# Patient Record
Sex: MALE | Race: BLACK OR AFRICAN AMERICAN | Employment: FULL TIME | URBAN - METROPOLITAN AREA
[De-identification: names, ages, dates, MRNs, and addresses within clinical notes are randomized per-mention and may not be internally consistent; named-entity substitution may affect disease eponyms.]

---

## 2020-08-26 PROBLEM — F17.210 CIGARETTE NICOTINE DEPENDENCE WITHOUT COMPLICATION: Status: ACTIVE | Noted: 2020-08-26

## 2020-08-26 PROBLEM — R19.7 FREQUENT DIARRHEA: Status: ACTIVE | Noted: 2020-08-26

## 2020-08-26 PROBLEM — R10.9 ABDOMINAL CRAMPING: Status: ACTIVE | Noted: 2020-08-26

## 2022-03-18 PROBLEM — R19.7 FREQUENT DIARRHEA: Status: ACTIVE | Noted: 2020-08-26

## 2022-03-18 PROBLEM — F17.210 CIGARETTE NICOTINE DEPENDENCE WITHOUT COMPLICATION: Status: ACTIVE | Noted: 2020-08-26

## 2022-03-18 PROBLEM — F32.2 SEVERE MAJOR DEPRESSION (HCC): Status: ACTIVE | Noted: 2020-08-26

## 2022-03-19 PROBLEM — R10.9 ABDOMINAL CRAMPING: Status: ACTIVE | Noted: 2020-08-26

## 2022-11-27 ENCOUNTER — HOSPITAL ENCOUNTER (EMERGENCY)
Age: 27
Discharge: HOME | End: 2022-11-27
Attending: EMERGENCY MEDICINE
Payer: MEDICAID

## 2022-11-27 VITALS
HEART RATE: 65 BPM | DIASTOLIC BLOOD PRESSURE: 61 MMHG | SYSTOLIC BLOOD PRESSURE: 119 MMHG | BODY MASS INDEX: 19.29 KG/M2 | WEIGHT: 120 LBS | TEMPERATURE: 98.5 F | OXYGEN SATURATION: 98 % | RESPIRATION RATE: 18 BRPM | HEIGHT: 66 IN

## 2022-11-27 DIAGNOSIS — R10.84 ABDOMINAL PAIN, GENERALIZED: Primary | ICD-10-CM

## 2022-11-27 PROCEDURE — 99282 EMERGENCY DEPT VISIT SF MDM: CPT

## 2022-11-27 NOTE — Clinical Note
Mckinley Landisnch was seen and treated in our emergency department on 11/27/2022. Pt was seen today for symptoms that began three days ago.  He is now clear to return to work,    Francisco J Vargas MD

## 2022-11-27 NOTE — ED TRIAGE NOTES
Pt requesting work note d/t abdominal pain; denies currently having symptoms; denies N/V/D; NAD noted

## 2022-11-27 NOTE — ED PROVIDER NOTES
EMERGENCY DEPARTMENT HISTORY AND PHYSICAL EXAM      Date: 11/27/2022  Patient Name: Severo Kotahri    History of Presenting Illness     Chief Complaint   Patient presents with    Letter for School/Work    Abdominal Pain       History Provided By: Patient    HPI: Severo Kothari, 33 y.o. male presents to the ED with CC of having had generalized pain but none at present. He denies having NVD, fever, chills, but had to leave work and needs a note. Patient denies SOB, chest pain, or any neurological symptoms. There are no other complaints, changes, or physical findings at this time. Past History     Past Medical History:  Past Medical History:   Diagnosis Date    Depression     GERD (gastroesophageal reflux disease)        Allergies:  No Known Allergies    Review of Systems   Vital signs and nursing notes reviewed  Review of Systems   Constitutional:  Negative for chills and fever. HENT: Negative. Negative for congestion, rhinorrhea, sneezing and sore throat. Eyes: Negative. Negative for redness and visual disturbance. Respiratory: Negative. Negative for cough, shortness of breath and wheezing. Cardiovascular: Negative. Negative for chest pain and leg swelling. Gastrointestinal:  Positive for abdominal pain. Negative for diarrhea, nausea and vomiting. Genitourinary: Negative. Negative for difficulty urinating and frequency. Musculoskeletal: Negative. Negative for arthralgias, back pain, myalgias and neck stiffness. Skin: Negative. Negative for color change and rash. Neurological: Negative. Hematological:  Negative for adenopathy. Psychiatric/Behavioral: Negative. All other systems reviewed and are negative. Physical Exam   Visit Vitals  /61   Pulse 65   Temp 98.5 °F (36.9 °C)   Resp 18   Ht 5' 6\" (1.676 m)   Wt 54.4 kg (120 lb)   SpO2 98%   BMI 19.37 kg/m²     CONSTITUTIONAL: Alert, in no distress. Appears stated age.   HEAD:  Normocephalic, atraumatic  EYES: EOM intact. No conjunctival injection or scleral icterus  Neck:  Supple. No meningismus  RESP: Normal with no work of breathing, speaking in full sentences. Abd: NON TENDER, NON DISTENDED, NON FOCAL ABD EXAM, NO REBOUND OR GURDING  CV: Well perfused. NEURO: Alert with normal mentation, moving extremities spontaneously  PSYCH: Normal mood, normal affect      Medical Decision Making   DDx:Abd pain that resolved in 12 hours, possible viral syndrome but currently without any complaints with benign physical exam. No work-up needed. ED Course:   Initial assessment performed. The patients presenting problems have been discussed, and they are in agreement with the care plan formulated and outlined with them. I have encouraged them to ask questions as they arise throughout their visit. Critical Care Time: None    Disposition:  DISCHARGE NOTE:  The pt is ready for discharge. The pt's signs, symptoms, diagnosis, and discharge instructions have been discussed and pt has conveyed their understanding. The pt is to follow up as recommended or return to ER should their symptoms worsen. Plan has been discussed and pt is in agreement. PLAN:  1. Current Discharge Medication List        2. Follow-up Information    None         4. Take Tylenol or Ibuprofen as needed  5. Drink plenty of fluids  6. Return to ED if worse especially if pain returns as needed. Diagnosis     Clinical Impression:   1. Abdominal pain, generalized        Please note that this dictation was completed with [employer], the computer voice recognition software. Quite often unanticipated grammatical, syntax, homophones, and other interpretive errors are inadvertently transcribed by the computer software. Please disregards these errors. Please excuse any errors that have escaped final proofreading.

## 2022-11-30 ENCOUNTER — HOSPITAL ENCOUNTER (EMERGENCY)
Age: 27
Discharge: HOME | End: 2022-11-30
Attending: FAMILY MEDICINE
Payer: MEDICAID

## 2022-11-30 VITALS
HEIGHT: 66 IN | WEIGHT: 117 LBS | OXYGEN SATURATION: 100 % | SYSTOLIC BLOOD PRESSURE: 143 MMHG | RESPIRATION RATE: 16 BRPM | BODY MASS INDEX: 18.8 KG/M2 | DIASTOLIC BLOOD PRESSURE: 83 MMHG | HEART RATE: 77 BPM | TEMPERATURE: 98.6 F

## 2022-11-30 DIAGNOSIS — J06.9 VIRAL URI WITH COUGH: Primary | ICD-10-CM

## 2022-11-30 PROCEDURE — 99282 EMERGENCY DEPT VISIT SF MDM: CPT

## 2022-11-30 NOTE — LETTER
[address]  [address] [facility] [address]  [phone]    Work/School Note    Date: 11/30/2022    To Whom It May concern:      Edil Junior was seen and treated today in the emergency room by the following provider(s):  Attending Provider: Reina Simmonds D Bass-Taylor is excused from work/school on 11/28/2022-11/31/2022    He is clear to return to work/school on 12/01/22.         Sincerely,          Callie Maddox RN

## 2022-11-30 NOTE — LETTER
Josy Bailey was seen and treated in our emergency department on 11/30/2022.   Please excuse Noelle Boast from work from 11/28 through 11/30/2022        Julieth Gunn, DO

## 2022-11-30 NOTE — ED PROVIDER NOTES
EMERGENCY DEPARTMENT HISTORY AND PHYSICAL EXAM      Date: 11/30/2022  Patient Name: Bonita Noel    History of Presenting Illness     Chief Complaint   Patient presents with    Letter for School/Work       History Provided By: Patient    HPI: Bonita Noel, 33 y.o. male presents to the ED with CC of letter for work. Patient recently diagnosed with influenza. Now feeling better. No fevers. Patient denies alleviating nor aggravating factors. Patient denies SOB, chest pain, or any neurological symptoms. Patient denies any other symptoms nor concerns at this time  Past History     Past Medical History:  Past Medical History:   Diagnosis Date    Depression     GERD (gastroesophageal reflux disease)        Allergies:  No Known Allergies    Review of Systems   Vital signs and nursing notes reviewed  Review of Systems   Constitutional:  Negative for activity change, appetite change, chills, fatigue and fever. HENT:  Negative for congestion and sore throat. Eyes:  Negative for photophobia and visual disturbance. Respiratory:  Positive for cough. Negative for shortness of breath and wheezing. Cardiovascular:  Negative for chest pain, palpitations and leg swelling. Gastrointestinal:  Negative for abdominal pain, diarrhea, nausea and vomiting. Endocrine: Negative for cold intolerance and heat intolerance. Musculoskeletal:  Negative for gait problem and joint swelling. Skin:  Negative for color change and rash. Neurological:  Negative for dizziness and headaches. Physical Exam   Visit Vitals  BP (!) 143/83   Pulse 77   Temp 98.6 °F (37 °C)   Resp 16   Ht 5' 6\" (1.676 m)   Wt 53.1 kg (117 lb)   SpO2 100%   BMI 18.88 kg/m²     CONSTITUTIONAL: Alert, in no distress. Appears stated age.,  Nontoxic  HEAD:  Normocephalic, atraumatic, uvula midline, no muffled voice, no findings of peritonsillar abscess, tolerating secretions with ease  EYES: EOM intact.   No conjunctival injection or scleral icterus  Neck:  Supple. No meningismus,  RESP: No increased work of breathing, lungs clear to auscultation bilaterally. No wheezes rales nor rhonchi  CV: Heart is regular rate and rhythm, no murmurs, no JVD, capillary refill less than 2 seconds  NEURO: Moves all extremities spontaneously. No motor nor sensory deficits. No focal neurologic deficits. PSYCH: Normal mood, normal affect      ED course/medical decision making       Patient presented to the emergency department with the aforementioned chief complaint. On examination the patient is nontoxic and overall well-appearing. No hypoxia. Well-hydrated on exam.  Lungs are clear to auscultation bilaterally. No increased work of breathing. COVID-19 testing was not conducted. Patient was given quarantine/isolation recommendations and agrees with the plan to be discharged home. They were provided instructions to return to the emergency department with any difficulty breathing, chest pain, altered mentation or any other new or worsening symptoms. Patient was discharged home in stable and ambulatory condition. Critical Care Time: None    Disposition:  DISCHARGE NOTE:  The pt is ready for discharge. The pt's signs, symptoms, diagnosis, and discharge instructions have been discussed and pt has conveyed their understanding. The pt is to follow up as recommended or return to ER should their symptoms worsen. Plan has been discussed and pt is in agreement. PLAN:  1. Current Discharge Medication List        2. Follow-up Information       Follow up With Specialties Details Why Contact Info    Your primary care doctor  Schedule an appointment as soon as possible for a visit in 2 days            3. Take Tylenol or Ibuprofen as needed  4. Drink plenty of fluids  5. Return to ED if worse especially if any shortness of breath, chest pain or altered mentation. Diagnosis     Clinical Impression:   1.  Viral URI with cough            Please note that this dictation was completed with [employer], the computer voice recognition software. Quite often unanticipated grammatical, syntax, homophones, and other interpretive errors are inadvertently transcribed by the computer software. Please disregards these errors. Please excuse any errors that have escaped final proofreading.

## 2024-05-08 ENCOUNTER — HOSPITAL ENCOUNTER (EMERGENCY)
Facility: HOSPITAL | Age: 29
Discharge: HOME | End: 2024-05-08
Attending: EMERGENCY MEDICINE

## 2024-05-08 VITALS
HEART RATE: 95 BPM | SYSTOLIC BLOOD PRESSURE: 139 MMHG | OXYGEN SATURATION: 95 % | DIASTOLIC BLOOD PRESSURE: 87 MMHG | RESPIRATION RATE: 22 BRPM | WEIGHT: 129.2 LBS | BODY MASS INDEX: 19.58 KG/M2 | HEIGHT: 68 IN

## 2024-05-08 DIAGNOSIS — S60.311A ABRASION OF RIGHT THUMB, INITIAL ENCOUNTER: ICD-10-CM

## 2024-05-08 DIAGNOSIS — S70.211A ABRASION OF RIGHT HIP, INITIAL ENCOUNTER: Primary | ICD-10-CM

## 2024-05-08 DIAGNOSIS — F23 ACUTE PSYCHOSIS (HCC): ICD-10-CM

## 2024-05-08 PROCEDURE — 99282 EMERGENCY DEPT VISIT SF MDM: CPT

## 2024-05-08 RX ORDER — LORAZEPAM 2 MG/ML
2 INJECTION INTRAMUSCULAR ONCE
Status: DISCONTINUED | OUTPATIENT
Start: 2024-05-08 | End: 2024-05-08 | Stop reason: HOSPADM

## 2024-05-08 ASSESSMENT — PAIN - FUNCTIONAL ASSESSMENT: PAIN_FUNCTIONAL_ASSESSMENT: NONE - DENIES PAIN

## 2024-05-08 NOTE — DISCHARGE INSTRUCTIONS
[facility]    Accepts Insured Patients Only:  [facility]  [address]       [phone]   Near the corner [facility] [address] [place] [facility].  Accepts most insurance including Medicaid/Medicare.  No psychiatry.  On the [facility] bus line.    [facility]  [address] ([place])     [phone]  [address] [facility] [address]     [phone]  [address] ([place])     [phone]   Accepts most major insurances.  Psychiatry available.  Some DBT groups.    [facility])    [phone]   Mixture of psychologists and psychiatrists.  They do not accept Medicaid or Medicare.    [facility]  [address]       [phone]   Mixture of clinical social workers and psychologists.      Sliding Scale/Financial Aid/Differing Payment Options  [facility]  [address] ([place])      [phone]   Our own Miller Solis and Lin Israel.  Variety of treatment options, including DBT.    [facility]  [facility]       [phone]   Provides a variety of group and individual counseling options.  Insurance, Medicaid, Medicare and sliding scale      Medicaid/Medicare providers in the [address] [place] [address]  [facility]  [address]       [phone]    [facility]         [address]       [phone]    [place] [phone]      [facility]  [facility]     [phone]    [facility]      [phone]    [facility]       [phone]    [facility]      [phone]      Services for patients without Medicaid, Medicare or Insurance  [facility]       [phone]    [facility]--Bharat Vanessa    [phone]

## 2024-05-08 NOTE — ED NOTES
Discharge instructions given to patient by RN. Pt has been given counseling regarding at home treatment plan. Pt verbalizes understanding of need to seek further treatment if symptoms worsen. Pt ambulated off of unit in no signs of distress with [employer].

## 2024-05-08 NOTE — ED PROVIDER NOTES
triamcinolone (KENALOG) 0.1 % ointment Apply topically 2 times daily, Topical, 2 TIMES DAILY Starting Tue 8/31/2021, Historical Med             SCREENINGS               No data recorded         PHYSICAL EXAM      ED Triage Vitals [05/08/24 0013]   Enc Vitals Group      BP (!) 154/102      Pulse 95      Respirations 22      Temp       Temp src       SpO2 95 %      Weight - Scale 58.6 kg (129 lb 3.2 oz)      Height 1.727 m (5' 8\")      Head Circumference       Peak Flow       Pain Score       Pain Loc       Pain Edu?       Excl. in GC?         Physical Exam  Vitals and nursing note reviewed.   Constitutional:       General: He is in acute distress.      Appearance: Normal appearance. He is not ill-appearing.      Comments: Patient crying, perseverating on significant other.   HENT:      Head: Normocephalic and atraumatic.      Comments: No signs of head trauma, no abrasions or hematoma     Nose: Nose normal.      Mouth/Throat:      Mouth: Mucous membranes are moist.   Eyes:      General:         Right eye: No discharge.         Left eye: No discharge.      Conjunctiva/sclera: Conjunctivae normal.      Pupils: Pupils are equal, round, and reactive to light.   Neck:      Comments: Freely ranging neck  Cardiovascular:      Rate and Rhythm: Normal rate and regular rhythm.      Heart sounds: No murmur heard.  Pulmonary:      Effort: Pulmonary effort is normal. No respiratory distress.      Breath sounds: Normal breath sounds. No stridor. No wheezing or rales.   Abdominal:      General: Abdomen is flat. Bowel sounds are normal. There is no distension.      Palpations: Abdomen is soft.      Tenderness: There is no abdominal tenderness. There is no guarding.      Hernia: No hernia is present.   Musculoskeletal:         General: No tenderness or deformity. Normal range of motion.      Cervical back: Neck supple. No rigidity.      Comments: Moving all extremities, full range of motion passively in the bilateral lower
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
